# Patient Record
Sex: FEMALE | Race: WHITE | URBAN - METROPOLITAN AREA
[De-identification: names, ages, dates, MRNs, and addresses within clinical notes are randomized per-mention and may not be internally consistent; named-entity substitution may affect disease eponyms.]

---

## 2023-10-31 ENCOUNTER — APPOINTMENT (RX ONLY)
Dept: URBAN - METROPOLITAN AREA CLINIC 75 | Facility: CLINIC | Age: 43
Setting detail: DERMATOLOGY
End: 2023-10-31

## 2023-10-31 DIAGNOSIS — L82.1 OTHER SEBORRHEIC KERATOSIS: ICD-10-CM

## 2023-10-31 DIAGNOSIS — L57.3 POIKILODERMA OF CIVATTE: ICD-10-CM

## 2023-10-31 DIAGNOSIS — F42.4 EXCORIATION (SKIN-PICKING) DISORDER: ICD-10-CM

## 2023-10-31 PROCEDURE — ? COUNSELING

## 2023-10-31 PROCEDURE — 99203 OFFICE O/P NEW LOW 30 MIN: CPT

## 2023-10-31 PROCEDURE — ? ADDITIONAL NOTES

## 2023-10-31 ASSESSMENT — LOCATION SIMPLE DESCRIPTION DERM
LOCATION SIMPLE: RIGHT CHEEK
LOCATION SIMPLE: SUBMENTAL CHIN
LOCATION SIMPLE: CHEST
LOCATION SIMPLE: LEFT THIGH
LOCATION SIMPLE: LEFT CHEEK

## 2023-10-31 ASSESSMENT — LOCATION ZONE DERM
LOCATION ZONE: TRUNK
LOCATION ZONE: LEG
LOCATION ZONE: FACE

## 2023-10-31 ASSESSMENT — LOCATION DETAILED DESCRIPTION DERM
LOCATION DETAILED: SUBMENTAL CHIN
LOCATION DETAILED: LEFT SUPERIOR CENTRAL BUCCAL CHEEK
LOCATION DETAILED: STERNAL NOTCH
LOCATION DETAILED: LEFT CENTRAL MALAR CHEEK
LOCATION DETAILED: RIGHT CENTRAL BUCCAL CHEEK
LOCATION DETAILED: LEFT ANTERIOR DISTAL THIGH

## 2023-10-31 NOTE — PROCEDURE: REASSURANCE
Hide Include Location In Plan Question?: No
Additional Note: Patient has been reassured of the beginning growth on the thigh. Patient has been informed if the lesion becomes irritated, itchy, or bothersome will discuss using liquid nitrogen to treat.
Detail Level: Simple

## 2023-10-31 NOTE — PROCEDURE: ADDITIONAL NOTES
"[facility]   Physical Therapy Treatment Note     Name: Jen Alarcon  Clinic Number: [id number]    Therapy Diagnosis:   Encounter Diagnoses   Name Primary?    Decreased range of motion of left knee Yes    Muscle weakness of lower extremity     Impaired functional mobility, balance, gait, and endurance      Physician: Art Yuan MD    Visit Date: 2023    Physician Orders: PT Eval and Treat   Medical Diagnosis: Z96.652 (ICD-10-CM) - History of total knee replacement, left  Evaluation Date: 3/28/2023  Surgery Date: 3/13/2023  Authorization Period Expiration: 2023  Plan of Care Certification Period: 2023  Visit # / Visits authorized: 8 ()  FOTO:  (Last issued: 3/28/2023)  PTA Visit #:     Precautions: Standard    Time In: 8:33 am  Time Out: 9:16 am  Total Billable Time: 43 minutes    SUBJECTIVE   Pt reports: Icing and wearing the compression sleeve helped. It's a little stiff this morning but not as bad as yesterday    She was compliant with home exercise program.  Response to previous treatment: "It was great"  Functional change: None today    Pain: 1-2/10 stiffness  Location: L knee    OBJECTIVE     2023:  L knee AROM: 0-3-97    2023:  L knee AROM: 0-3-98* *indicates guarding/pain prior to endrange with empty EF  MMT:   Quads = 4-/5   HS = 4-/5   Hip flex = 4-/5   Hip abd = 4-/5   Hip ext = 4-  30" STS: 8x  5x STS: 20"  TU"  Girth (mid patella): R = 37 cm, L = 40.5 cm       TREATMENT     JEN received the bolded treatments listed below:      Patient received therapeutic exercises for 30 minutes for improved strength and AROM including:  +Recumbent bike 6'  Self STM with rolling pin to HS/ calves/ quads/ADD/ITB x 3'  Quad sets with towel under ANKLE 15 x 5"  SLR x 15  Prone TKE x 15 x 5"    Heel prop with 3# above and below knee x 3'  Prone hangs 1# x 5'  Heel slides x 10  10" hold   Hamstring stretch 3 x 30"  Gastroc stretch on incline 3 x "
"30"  LAQ 3 x 10 - with ball squeeze today  Seated w/ strap Soleus stretch 3 x 30"  SLR x 20  S/L hip abd x 15  TKE with thin red super band 2 x 10    Future visit:  Recumbent bike    Patient received manual therapeutic technique for 13 minutes for improved soft tissue and joint mobility including:  PA/AP Tibiofemoral mob - seated on EOM today  Tibfem joint distraction w/ and w/o oscillations - EOM   Overpressure into extension/flex      Patient received neuromuscular reeducation for 00 minutes for improved proprioception and balance including:        Patient received therapeutic activities for 00 minutes for improved tolerance to functional activities including:  Retro walking on TM x 5'  STS x 15    Patient received gait training for 00 minutes today that included:   Heel toe rocking in // 20 x   Toe off high knee as grzegorz in // 20 x     PATIENT EDUCATION AND HOME EXERCISES     Home Exercises Provided and Patient Education Provided     Education provided:   - Continuance of HEP, especially extension    Written Home Exercises Provided: Patient instructed to cont prior HEP. Exercises were reviewed and RANDY was able to demonstrate them prior to the end of the session.  RANDY demonstrated good  understanding of the education provided. See EMR under Patient Instructions for exercises provided during therapy sessions    ASSESSMENT   Today's treatment focused on extension and included recumbent bike for ROM. Pt tolerated treatment well with pain during overpressure into extension. Pt unable to get full revolutions on bike. Will continue to improve knee ROM and LE strengthening.     RANDY Is progressing well towards her goals.   Pt prognosis is Good.     Pt will continue to benefit from skilled outpatient physical therapy to address the deficits listed in the problem list box on initial evaluation, provide pt/family education and to maximize pt's level of independence in the home and community environment.     Pt's "
spiritual, cultural and educational needs considered and pt agreeable to plan of care and goals.     Anticipated barriers to physical therapy: None    Goals:   Short Term Goals: 4 weeks   Independent with HEP.  Report decreased L knee pain < or =  2/10 with adls such as bending, extension, prolonged sitting, increased walking distances, and negotiating steps.  No extensor lag in L knee with straight leg raises.   Increase L knee extension to full extension  Pt demonstrating good, strong quad activation.  Increased L knee AROM flex to 110 deg.  I household ambulation.  Up/down stairs with mod I using alternating step pattern.     Long Term Goals: 8 weeks   Increased L knee AROM 0 to 120 deg.  I community ambulation  I negotiating stairs with alternating step pattern  Report decreased L knee pain < or =  1/10 with adls such as bending, extension, prolonged sitting, increased walking distances, and negotiating steps.  Increased MMT for B LE to 4/5 muscle grade to promote proper pelvic stability to decrease L knee pain < or =  1/10 with adls such as bending, extension, prolonged sitting, increased walking distances, and negotiating steps.  Increased flexibility in B hamstrings to -20 deg with 90/90 test to promote proper pelvic stability to decrease L knee pain < or =  1/10 with adls such as bending, extension, prolonged sitting, increased walking distances, and negotiating steps.   Patient to achieve CJ (at least 20% < 40% impaired, limited or restricted) level on the [employer] Outcomes Measurement System.    PLAN   Certification Period/Plan of care expiration: 3/28/2023 to 5/23/2023.     Improve L knee ROM and strength     Outpatient Physical Therapy 2 times weekly for 8 weeks to include the following interventions: Gait Training, Manual Therapy, Moist Heat/ Ice, Neuromuscular Re-ed, Patient Education, Therapeutic Activities, and Therapeutic Exercise.     Rosmery Bailey III, PTA    
Detail Level: Zone
Additional Notes: 10/31/23\\nPatient presents with multiple excoriations around the face and neck. Patient reports to pick at the lesions due to fluid/ black underneath the skin. Patient has been informed it is difficult to treat the lesions because of the excoriations present at the visit. Patient will refrain from touching or picking at lesions. Once the lesions reappear will schedule an appointment for further evaluation and management.
Render Risk Assessment In Note?: no